# Patient Record
Sex: MALE | Race: OTHER | HISPANIC OR LATINO | URBAN - METROPOLITAN AREA
[De-identification: names, ages, dates, MRNs, and addresses within clinical notes are randomized per-mention and may not be internally consistent; named-entity substitution may affect disease eponyms.]

---

## 2017-01-21 ENCOUNTER — EMERGENCY (EMERGENCY)
Facility: HOSPITAL | Age: 62
LOS: 1 days | Discharge: HOME | End: 2017-01-21
Attending: EMERGENCY MEDICINE | Admitting: EMERGENCY MEDICINE
Payer: COMMERCIAL

## 2017-01-21 VITALS
RESPIRATION RATE: 20 BRPM | SYSTOLIC BLOOD PRESSURE: 155 MMHG | HEART RATE: 63 BPM | WEIGHT: 166.89 LBS | HEIGHT: 66 IN | OXYGEN SATURATION: 97 % | DIASTOLIC BLOOD PRESSURE: 80 MMHG | TEMPERATURE: 98 F

## 2017-01-21 DIAGNOSIS — R05 COUGH: ICD-10-CM

## 2017-01-21 DIAGNOSIS — E78.00 PURE HYPERCHOLESTEROLEMIA, UNSPECIFIED: ICD-10-CM

## 2017-01-21 DIAGNOSIS — Z88.0 ALLERGY STATUS TO PENICILLIN: ICD-10-CM

## 2017-01-21 DIAGNOSIS — E78.5 HYPERLIPIDEMIA, UNSPECIFIED: ICD-10-CM

## 2017-01-21 DIAGNOSIS — J34.89 OTHER SPECIFIED DISORDERS OF NOSE AND NASAL SINUSES: ICD-10-CM

## 2017-01-21 DIAGNOSIS — R06.02 SHORTNESS OF BREATH: ICD-10-CM

## 2017-01-21 DIAGNOSIS — Z79.899 OTHER LONG TERM (CURRENT) DRUG THERAPY: ICD-10-CM

## 2017-01-21 PROCEDURE — 71020: CPT | Mod: NC

## 2017-01-21 PROCEDURE — 71020: CPT | Mod: 26

## 2017-01-21 PROCEDURE — 71046 X-RAY EXAM CHEST 2 VIEWS: CPT

## 2017-01-21 PROCEDURE — 99283 EMERGENCY DEPT VISIT LOW MDM: CPT | Mod: 25

## 2017-01-21 PROCEDURE — 99284 EMERGENCY DEPT VISIT MOD MDM: CPT | Mod: 25

## 2017-01-21 RX ORDER — ATORVASTATIN CALCIUM 80 MG/1
1 TABLET, FILM COATED ORAL
Qty: 0 | Refills: 0 | COMMUNITY

## 2017-01-21 NOTE — ED PROVIDER NOTE - ENMT, MLM
Airway patent, Nasal mucosa clear. Mouth with normal mucosa. Throat has no vesicles, no oropharyngeal exudates and uvula is midline.  tm nl bilat

## 2017-01-21 NOTE — ED PROVIDER NOTE - DIAGNOSTIC INTERPRETATION
ER Physician:  Lyric Director  CHEST XRAY INTERPRETATION: lungs clear, heart shadow normal, bony structures intact

## 2017-01-21 NOTE — ED ADULT TRIAGE NOTE - CHIEF COMPLAINT QUOTE
Patient c/o cough for 2 weeks and headache for 2 days. Denies any chest pain , sob , fever nor chills .

## 2017-01-21 NOTE — ED PROVIDER NOTE - MEDICAL DECISION MAKING DETAILS
60 yo male c/o cough x 2 wk s/p resolved uri sx.  ? residual cough vs bronchitis vs pna.  Lung cta, afebrile - doubt pna.  Will check cxr, reassess.

## 2017-01-21 NOTE — ED PROVIDER NOTE - OBJECTIVE STATEMENT
60 yo male h/o high chol c/o cough x 2 wk.  No fever, cp, palpitations, + mild sob.  Pt w resolved st and ear pain, + rhinorrhea and sputum - green.  No sick contacts, travel.  No h/o pulm disease.  No night sweats or wt loss.  No relief w robitussin.

## 2023-07-10 PROBLEM — E78.5 HYPERLIPIDEMIA, UNSPECIFIED: Chronic | Status: ACTIVE | Noted: 2017-01-21

## 2023-07-17 ENCOUNTER — APPOINTMENT (OUTPATIENT)
Dept: UROLOGY | Facility: CLINIC | Age: 68
End: 2023-07-17
Payer: MEDICARE

## 2023-07-17 VITALS
TEMPERATURE: 97.9 F | HEART RATE: 61 BPM | BODY MASS INDEX: 26.06 KG/M2 | DIASTOLIC BLOOD PRESSURE: 75 MMHG | WEIGHT: 166 LBS | RESPIRATION RATE: 16 BRPM | HEIGHT: 66.93 IN | OXYGEN SATURATION: 96 % | SYSTOLIC BLOOD PRESSURE: 119 MMHG

## 2023-07-17 PROBLEM — Z00.00 ENCOUNTER FOR PREVENTIVE HEALTH EXAMINATION: Status: ACTIVE | Noted: 2023-07-17

## 2023-07-17 PROCEDURE — 99204 OFFICE O/P NEW MOD 45 MIN: CPT

## 2023-07-17 PROCEDURE — 51798 US URINE CAPACITY MEASURE: CPT

## 2023-07-17 NOTE — HISTORY OF PRESENT ILLNESS
[FreeTextEntry1] : Language: English\par Date of First visit: 07/17/2023 \par Accompanied by: self\par Contact info: \par Referring Provider/PCP: Dr. Avalos\par Fax: [phone]\par \par \par \par CC/ Problem List:\par PSA screening\par \par ===============================================================================\par FIRST VISIT / Summary:\par Very pleasant 68 year old M here for general checkup and screening for prostate cancer. He has some mild pain in the testicles off and on. Minimal LUTS\par \par -------------------------------------------------------------------------------------------\par INTERVAL VISITS:\par \par ===============================================================================\par \par PMH: \par Meds: losartan, atorvastatin\par All: penicillin (on allergy testing no medication reaction hx)\par FHx: No  malignancies (mother breast ca)\par SocHx: former smoker, etoh up to 6/day, \par \par PSH: knees\par \par \par ROS: Review of Systems is as per HPI unless otherwise denoted below\par \par \par ===============================================================================\par DATA: \par \par LABS (SELECTED):---------------------------------------------------------------------------------------------------\par \par \par RADS:-------------------------------------------------------------------------------------------------------------------\par \par \par PATHOLOGY/CYTOLOGY:-------------------------------------------------------------------------------------------\par \par \par VOIDING STUDIES: ----------------------------------------------------------------------------------------------------\par 07/17/2023 PVR 30\par \par STONE STUDIES: (Analysis/LLSA)----------------------------------------------------------------------------------\par \par \par PROCEDURES: -----------------------------------------------------------------------------------------------\par \par \par \par \par ===============================================================================\par \par PHYSICAL EXAM:\par \par GEN: AAOx3, NAD\par \par PSYCH: Appropriate Behavior, Affect Congruent\par \par Lungs: No labored breathing\par \par GAIT: Gait normal, Stability good\par \par ABD: no suprapubic or CVAT\par \par \par  FOCUSED: ----------------------------------------------------------------------------------------------------------------\par \par Date: 07/17/2023 \par Chaperone: offered and patient declined\par \par Penis: No lesions, tenderness, curvature, plaques. Circumcised. \par \par Meatus: normal caliber\par \par Testes: Descended bilaterally. Non tender, no palpable masses\par \par Epididymi: No palpable epididymal masses\par \par Scrotum: Scrotal wall normal. No spermatic cord mass. No palpable hydroceles \par \par Prostate: mildly enlarged approx 45g, nontender, symmetric, no nodules\par \par  \par \par =======================================================================================\par DISCUSSION: \par =======================================================================================\par ASSESSMENT and PLAN\par \par \par 1. PSA screening\par - check today\par - RUSH normal other than enlarged\par \par 2. Testicular pain\par - ibuprofen\par - exam normal\par \par 3. LUTS\par - slow stream, trial tamsulosin\par \par \par =======================================================================================\par The total time personally spent preparing for this visit (reviewing test results, obtaining external history) and during the visit (ordering tests/medications, spent face to face with the patient / family and counseling them on the above), as well as after the visit (on clinical documentation and coordination with other care providers) was approximately 45minutes. \par Thank you for allowing me to assist in the care of your patient. Should you have any questions please do not hesitate to reach out to me.\par \par \par Noel Booth MD                                                            \par [facility]\par [facility]\par \par [place] Office:\par [address], [address]   \[place] [address]    \par T: [phone]    \par F: [phone]    \par \par [place] Office:\par [address], [address]\[place] [address]\par T: [phone]\par F: [phone]

## 2023-07-18 LAB
PSA FREE FLD-MCNC: 23 %
PSA FREE SERPL-MCNC: 0.35 NG/ML
PSA SERPL-MCNC: 1.47 NG/ML

## 2023-07-19 LAB — BACTERIA UR CULT: NORMAL

## 2023-08-14 ENCOUNTER — APPOINTMENT (OUTPATIENT)
Dept: UROLOGY | Facility: CLINIC | Age: 68
End: 2023-08-14
Payer: MEDICARE

## 2023-08-14 VITALS
HEART RATE: 59 BPM | BODY MASS INDEX: 26.06 KG/M2 | TEMPERATURE: 98 F | HEIGHT: 66.93 IN | DIASTOLIC BLOOD PRESSURE: 81 MMHG | WEIGHT: 166 LBS | SYSTOLIC BLOOD PRESSURE: 133 MMHG | OXYGEN SATURATION: 98 % | RESPIRATION RATE: 16 BRPM

## 2023-08-14 PROCEDURE — 99213 OFFICE O/P EST LOW 20 MIN: CPT

## 2023-08-14 NOTE — HISTORY OF PRESENT ILLNESS
[FreeTextEntry1] : Language: English Date of First visit: 07/17/2023  Accompanied by: self Contact info:  Referring Provider/PCP: Dr. Avalos Fax: [phone]    CC/ Problem List: PSA screening BPH with LUTS  =============================================================================== FIRST VISIT / Summary: Very pleasant 68 year old M here for general checkup and screening for prostate cancer. He has some mild pain in the testicles off and on. Minimal LUTS  ------------------------------------------------------------------------------------------- INTERVAL VISITS: 8/14/23: Discussed results of PSA. He was started on tamsulosin 0.4 mg daily but states after 4 days he started having lower back pain that would start when he got into bed. He states he was taking the medication during the middle of the day. States while he was taking tamsulosin noted improvement in urinary symptoms.  ===============================================================================  PMH:  Meds: losartan, atorvastatin All: penicillin (on allergy testing no medication reaction hx) FHx: No  malignancies (mother breast ca) SocHx: former smoker, etoh up to 6/day,   PSH: knees   ROS: Review of Systems is as per HPI unless otherwise denoted below   =============================================================================== DATA:   LABS (SELECTED):---------------------------------------------------------------------------------------------------   RADS:-------------------------------------------------------------------------------------------------------------------   PATHOLOGY/CYTOLOGY:-------------------------------------------------------------------------------------------   VOIDING STUDIES: ---------------------------------------------------------------------------------------------------- 07/17/2023 PVR 30  STONE STUDIES: (Analysis/LLSA)----------------------------------------------------------------------------------   PROCEDURES: -----------------------------------------------------------------------------------------------     ===============================================================================  PHYSICAL EXAM:  GEN: AAOx3, NAD  PSYCH: Appropriate Behavior, Affect Congruent  Lungs: No labored breathing  GAIT: Gait normal, Stability good  ABD: no suprapubic or CVAT    FOCUSED: ----------------------------------------------------------------------------------------------------------------  Date: 07/17/2023  Chaperone: offered and patient declined  Penis: No lesions, tenderness, curvature, plaques. Circumcised.   Meatus: normal caliber  Testes: Descended bilaterally. Non tender, no palpable masses  Epididymi: No palpable epididymal masses  Scrotum: Scrotal wall normal. No spermatic cord mass. No palpable hydroceles   Prostate: mildly enlarged approx 45g, nontender, symmetric, no nodules     ======================================================================================= DISCUSSION:  ======================================================================================= ASSESSMENT and PLAN   1. PSA screening - Discussed results with patient of 1.47 ng/mL - Repeat PSA in 1 year  2. BPH with LUTS - Discussed with patient that he should take tamsulosin after dinner.  - If his back pain continues with use patient to call and will discuss switching to rapaflo

## 2024-03-29 ENCOUNTER — APPOINTMENT (OUTPATIENT)
Dept: UROLOGY | Facility: CLINIC | Age: 69
End: 2024-03-29
Payer: MEDICARE

## 2024-03-29 VITALS
OXYGEN SATURATION: 97 % | TEMPERATURE: 97.6 F | DIASTOLIC BLOOD PRESSURE: 73 MMHG | SYSTOLIC BLOOD PRESSURE: 112 MMHG | BODY MASS INDEX: 26.06 KG/M2 | RESPIRATION RATE: 16 BRPM | HEIGHT: 66.93 IN | HEART RATE: 88 BPM | WEIGHT: 166 LBS

## 2024-03-29 DIAGNOSIS — Z12.5 ENCOUNTER FOR SCREENING FOR MALIGNANT NEOPLASM OF PROSTATE: ICD-10-CM

## 2024-03-29 PROCEDURE — 99214 OFFICE O/P EST MOD 30 MIN: CPT

## 2024-03-29 NOTE — HISTORY OF PRESENT ILLNESS
[FreeTextEntry1] :  OPAL SANTOS Jul 12 1955   Language: English Date of First visit: 07/17/2023 Accompanied by: self Contact info: Referring Provider/PCP: Dr. Avalos Fax: [phone]  CC/ Problem List: PSA screening BPH with LUTS  =============================================================================== FIRST VISIT / Summary: Very pleasant 68 year old M here for general checkup and screening for prostate cancer. He has some mild pain in the testicles off and on. Minimal LUTS  He was started on tamsulosin 0.4 mg daily but states after 4 days he started having lower back pain that would start when he got into bed. He states he was taking the medication during the middle of the day. States while he was taking tamsulosin noted improvement in urinary symptoms. ------------------------------------------------------------------------------------------- INTERVAL VISITS: The patient's medications and allergies were reviewed and edited below. Dated 03/29/2024   here for follow-up. Had short left testicular pain in [place]. He reports the back pain resolved. Still having benefit from tamsulosin  ===============================================================================  PMH: Meds: tamsulosin, losartan, atorvastatin All: penicillin (on allergy testing no medication reaction hx) FHx: No  malignancies (mother breast ca) SocHx: former smoker, etoh up to 6/day,   PSH: knees  ROS: Review of Systems is as per HPI unless otherwise denoted below  =============================================================================== DATA: LABS (SELECTED):--------------------------------------------------------------------------------------------------- 1/19/2024 PSA 1.3, cr 1.1 urine cx negative.   RADS:-------------------------------------------------------------------------------------------------------------------   PATHOLOGY/CYTOLOGY:-------------------------------------------------------------------------------------------   VOIDING STUDIES: ---------------------------------------------------------------------------------------------------- 07/17/2023 PVR 30  STONE STUDIES: (Analysis/LLSA)----------------------------------------------------------------------------------   PROCEDURES: -----------------------------------------------------------------------------------------------     ===============================================================================  PHYSICAL EXAM:  GEN: AAOx3, NAD PSYCH: Appropriate Behavior, Affect Congruent Lungs: No labored breathing GAIT: Gait normal, Stability good ABD: no suprapubic or CVAT    FOCUSED: ----------------------------------------------------------------------------------------------------------------  Date: 03/29/2024  Chaperone: offered and patient declined  Penis: No lesions, tenderness, curvature, plaques. Circumcised. Meatus: normal caliber Testes: Descended bilaterally. Non tender, left epididymal head cyst, no masses.  Epididymi: normal except as above Scrotum: Scrotal wall normal. No spermatic cord mass. No palpable hydroceles  07/17/2023 Prostate: mildly enlarged approx 45g, nontender, symmetric, no nodules    ======================================================================================= DISCUSSION: ======================================================================================= ASSESSMENT and PLAN   1. PSA screening - Discussed results with patient of 1.47 ng/mL - Repeat PSA in 1 year  2. BPH with LUTS - Discussed with patient that he should take tamsulosin after dinner. - If his back pain continues with use patient to call and will discuss switching to rapaflo  Noel Booth MD                                                             [facility]  [place] Office: [address], [address]    [place] [address]     T: [phone]     F: [phone]      [place] Office: [address] [place] [address], 1st [place] [address] T: [phone] [phone]

## 2024-04-29 DIAGNOSIS — N40.0 BENIGN PROSTATIC HYPERPLASIA WITHOUT LOWER URINARY TRACT SYMPMS: ICD-10-CM

## 2024-04-29 RX ORDER — TAMSULOSIN HYDROCHLORIDE 0.4 MG/1
0.4 CAPSULE ORAL
Qty: 90 | Refills: 3 | Status: ACTIVE | COMMUNITY
Start: 2023-07-17 | End: 1900-01-01

## 2025-03-21 ENCOUNTER — APPOINTMENT (OUTPATIENT)
Dept: UROLOGY | Facility: CLINIC | Age: 70
End: 2025-03-21
Payer: MEDICARE

## 2025-03-21 ENCOUNTER — NON-APPOINTMENT (OUTPATIENT)
Age: 70
End: 2025-03-21

## 2025-03-21 VITALS
OXYGEN SATURATION: 97 % | RESPIRATION RATE: 15 BRPM | TEMPERATURE: 98 F | DIASTOLIC BLOOD PRESSURE: 81 MMHG | WEIGHT: 166 LBS | SYSTOLIC BLOOD PRESSURE: 149 MMHG | HEART RATE: 60 BPM | BODY MASS INDEX: 26.68 KG/M2 | HEIGHT: 66 IN

## 2025-03-21 DIAGNOSIS — N52.9 MALE ERECTILE DYSFUNCTION, UNSPECIFIED: ICD-10-CM

## 2025-03-21 DIAGNOSIS — Z12.5 ENCOUNTER FOR SCREENING FOR MALIGNANT NEOPLASM OF PROSTATE: ICD-10-CM

## 2025-03-21 DIAGNOSIS — N40.0 BENIGN PROSTATIC HYPERPLASIA WITHOUT LOWER URINARY TRACT SYMPMS: ICD-10-CM

## 2025-03-21 PROCEDURE — 99214 OFFICE O/P EST MOD 30 MIN: CPT

## 2025-03-21 PROCEDURE — G2211 COMPLEX E/M VISIT ADD ON: CPT

## 2025-03-21 RX ORDER — OMEPRAZOLE 40 MG/1
40 CAPSULE, DELAYED RELEASE ORAL
Refills: 0 | Status: ACTIVE | COMMUNITY

## 2025-03-21 RX ORDER — MULTIVIT,CALC,MINS/IRON/FOLIC 9MG-400MCG
TABLET ORAL
Refills: 0 | Status: ACTIVE | COMMUNITY

## 2025-03-21 RX ORDER — LOSARTAN POTASSIUM AND HYDROCHLOROTHIAZIDE 100; 25 MG/1; MG/1
100-25 TABLET, FILM COATED ORAL
Refills: 0 | Status: ACTIVE | COMMUNITY

## 2025-03-21 RX ORDER — ATORVASTATIN CALCIUM 10 MG/1
10 TABLET, FILM COATED ORAL
Refills: 0 | Status: ACTIVE | COMMUNITY

## 2025-03-21 RX ORDER — ATORVASTATIN CALCIUM 20 MG/1
20 TABLET, FILM COATED ORAL
Refills: 0 | Status: ACTIVE | COMMUNITY

## 2025-03-21 RX ORDER — CETIRIZINE HYDROCHLORIDE 10 MG/1
10 TABLET, COATED ORAL
Refills: 0 | Status: ACTIVE | COMMUNITY

## 2025-03-21 RX ORDER — OMEPRAZOLE MAGNESIUM 40 MG/1
CAPSULE, DELAYED RELEASE ORAL
Refills: 0 | Status: ACTIVE | COMMUNITY

## 2025-03-21 RX ORDER — CHOLECALCIFEROL (VITAMIN D3) 1250 MCG
1.25 MG CAPSULE ORAL
Refills: 0 | Status: ACTIVE | COMMUNITY

## 2025-03-21 RX ORDER — SULFACETAMIDE SODIUM 100 MG/ML
10 SOLUTION OPHTHALMIC
Refills: 0 | Status: ACTIVE | COMMUNITY

## 2025-03-21 RX ORDER — ERGOCALCIFEROL 1.25 MG/1
1.25 MG CAPSULE ORAL
Refills: 0 | Status: ACTIVE | COMMUNITY

## 2025-03-21 RX ORDER — SILDENAFIL 50 MG/1
50 TABLET ORAL
Qty: 10 | Refills: 5 | Status: ACTIVE | COMMUNITY
Start: 2025-03-21 | End: 1900-01-01

## 2025-03-21 RX ORDER — EZETIMIBE 10 MG/1
10 TABLET ORAL
Refills: 0 | Status: ACTIVE | COMMUNITY

## 2025-03-21 RX ORDER — CHOLECALCIFEROL (VITAMIN D3) 125 MCG
TABLET ORAL
Refills: 0 | Status: ACTIVE | COMMUNITY

## 2025-03-21 RX ORDER — MONTELUKAST SODIUM 10 MG/1
10 TABLET, FILM COATED ORAL
Refills: 0 | Status: ACTIVE | COMMUNITY

## 2025-03-21 RX ORDER — BETAMETHASONE DIPROPIONATE 0.5 MG/G
0.05 CREAM TOPICAL
Refills: 0 | Status: ACTIVE | COMMUNITY

## 2025-03-23 PROBLEM — N52.9 ERECTILE DYSFUNCTION OF ORGANIC ORIGIN: Status: ACTIVE | Noted: 2025-03-23

## 2025-03-23 LAB
PSA FREE FLD-MCNC: 22 %
PSA FREE SERPL-MCNC: 0.39 NG/ML
PSA SERPL-MCNC: 1.77 NG/ML

## 2025-07-11 ENCOUNTER — RX RENEWAL (OUTPATIENT)
Age: 70
End: 2025-07-11